# Patient Record
Sex: FEMALE
[De-identification: names, ages, dates, MRNs, and addresses within clinical notes are randomized per-mention and may not be internally consistent; named-entity substitution may affect disease eponyms.]

---

## 2020-02-18 ENCOUNTER — APPOINTMENT (OUTPATIENT)
Dept: INTERNAL MEDICINE | Facility: CLINIC | Age: 35
End: 2020-02-18
Payer: MEDICAID

## 2020-02-18 VITALS
WEIGHT: 157 LBS | SYSTOLIC BLOOD PRESSURE: 108 MMHG | HEART RATE: 64 BPM | BODY MASS INDEX: 23.25 KG/M2 | DIASTOLIC BLOOD PRESSURE: 68 MMHG | HEIGHT: 69 IN | RESPIRATION RATE: 14 BRPM

## 2020-02-18 DIAGNOSIS — M25.50 PAIN IN UNSPECIFIED JOINT: ICD-10-CM

## 2020-02-18 DIAGNOSIS — Z87.891 PERSONAL HISTORY OF NICOTINE DEPENDENCE: ICD-10-CM

## 2020-02-18 DIAGNOSIS — E06.3 AUTOIMMUNE THYROIDITIS: ICD-10-CM

## 2020-02-18 DIAGNOSIS — Z83.3 FAMILY HISTORY OF DIABETES MELLITUS: ICD-10-CM

## 2020-02-18 DIAGNOSIS — R59.0 LOCALIZED ENLARGED LYMPH NODES: ICD-10-CM

## 2020-02-18 DIAGNOSIS — R41.0 DISORIENTATION, UNSPECIFIED: ICD-10-CM

## 2020-02-18 DIAGNOSIS — Z86.19 PERSONAL HISTORY OF OTHER INFECTIOUS AND PARASITIC DISEASES: ICD-10-CM

## 2020-02-18 DIAGNOSIS — R51 HEADACHE: ICD-10-CM

## 2020-02-18 DIAGNOSIS — K90.0 CELIAC DISEASE: ICD-10-CM

## 2020-02-18 DIAGNOSIS — Z80.0 FAMILY HISTORY OF MALIGNANT NEOPLASM OF DIGESTIVE ORGANS: ICD-10-CM

## 2020-02-18 DIAGNOSIS — E55.9 VITAMIN D DEFICIENCY, UNSPECIFIED: ICD-10-CM

## 2020-02-18 DIAGNOSIS — Z80.3 FAMILY HISTORY OF MALIGNANT NEOPLASM OF BREAST: ICD-10-CM

## 2020-02-18 PROBLEM — Z00.00 ENCOUNTER FOR PREVENTIVE HEALTH EXAMINATION: Status: ACTIVE | Noted: 2020-02-18

## 2020-02-18 PROCEDURE — G0008: CPT

## 2020-02-18 PROCEDURE — 99214 OFFICE O/P EST MOD 30 MIN: CPT | Mod: 25

## 2020-02-18 PROCEDURE — 90688 IIV4 VACCINE SPLT 0.5 ML IM: CPT

## 2020-02-18 PROCEDURE — 99385 PREV VISIT NEW AGE 18-39: CPT | Mod: 25

## 2020-02-18 RX ORDER — DISULFIRAM 250 MG/1
250 TABLET ORAL
Refills: 0 | Status: ACTIVE | COMMUNITY
Start: 2019-12-14

## 2020-02-18 RX ORDER — VITAMIN K2 90 MCG
125 MCG CAPSULE ORAL
Refills: 0 | Status: ACTIVE | COMMUNITY

## 2020-02-18 NOTE — PHYSICAL EXAM
[Well Nourished] : well nourished [Well Developed] : well developed [No Acute Distress] : no acute distress [Well-Appearing] : well-appearing [Normal Sclera/Conjunctiva] : normal sclera/conjunctiva [PERRL] : pupils equal round and reactive to light [EOMI] : extraocular movements intact [Normal Outer Ear/Nose] : the outer ears and nose were normal in appearance [Normal Oropharynx] : the oropharynx was normal [No JVD] : no jugular venous distention [Supple] : supple [Thyroid Normal, No Nodules] : the thyroid was normal and there were no nodules present [No Respiratory Distress] : no respiratory distress  [No Accessory Muscle Use] : no accessory muscle use [Normal Percussion] : the chest was normal to percussion [Clear to Auscultation] : lungs were clear to auscultation bilaterally [Normal Rate] : normal rate  [Regular Rhythm] : with a regular rhythm [Normal S1, S2] : normal S1 and S2 [No Murmur] : no murmur heard [No Carotid Bruits] : no carotid bruits [No Abdominal Bruit] : a ~M bruit was not heard ~T in the abdomen [No Varicosities] : no varicosities [Pedal Pulses Present] : the pedal pulses are present [No Edema] : there was no peripheral edema [No Palpable Aorta] : no palpable aorta [No Extremity Clubbing/Cyanosis] : no extremity clubbing/cyanosis [Normal Appearance] : normal in appearance [No Nipple Discharge] : no nipple discharge [Soft] : abdomen soft [Non Tender] : non-tender [Non-distended] : non-distended [No Masses] : no abdominal mass palpated [No HSM] : no HSM [Normal Bowel Sounds] : normal bowel sounds [Normal Supraclavicular Nodes] : no supraclavicular lymphadenopathy [Normal Axillary Nodes] : no axillary lymphadenopathy [Normal Posterior Cervical Nodes] : no posterior cervical lymphadenopathy [Normal Anterior Cervical Nodes] : no anterior cervical lymphadenopathy [Normal Inguinal Nodes] : no inguinal lymphadenopathy [Normal Femoral Nodes] : no femoral lymphadenopathy [No CVA Tenderness] : no CVA  tenderness [No Spinal Tenderness] : no spinal tenderness [No Joint Swelling] : no joint swelling [Grossly Normal Strength/Tone] : grossly normal strength/tone [No Rash] : no rash [Coordination Grossly Intact] : coordination grossly intact [No Focal Deficits] : no focal deficits [Normal Gait] : normal gait [Deep Tendon Reflexes (DTR)] : deep tendon reflexes were 2+ and symmetric [Speech Grossly Normal] : speech grossly normal [Memory Grossly Normal] : memory grossly normal [Normal Affect] : the affect was normal [Alert and Oriented x3] : oriented to person, place, and time [Normal Insight/Judgement] : insight and judgment were intact [Normal Mood] : the mood was normal [de-identified] : The patient appears well but enervated. [de-identified] : See lymph node exam below. [de-identified] : Very slightly cystic in the upper outer quadrants bilaterally. [de-identified] : 1 very shotty right posterior cervical lymph node, approximately 1 x 3 mm, firm and mobile, nontender.

## 2020-02-18 NOTE — HISTORY OF PRESENT ILLNESS
[FreeTextEntry1] : 34-year-old female here to establish care and for evaluation of a lump on her neck. [de-identified] : The patient recently noted a lump on the back of her neck on the right side.  She describes it as the size of a pea, which occasionally gets a little bigger and then reverts to its original size.  It is nontender and she has noticed no other similar lumps in her neck or elsewhere.\par The patient's history is notable for her having been diagnosed with acute Lyme disease in the summer 2018.  She presented with a bull's-eye rash, fever and a brain fog.  She was treated with a full month of doxycycline, during which her symptoms resolved.  The symptoms all recurred, however, once she stopped taking the antibiotics.  She was treated with another month of doxycycline, which again helped her symptoms.  After the second course of doxycycline, her symptoms again recurred but were less severe.  The symptoms have been chronic since that time.  The patient was running regularly prior to her diagnosis of Lyme disease, but has been unable to run or engage in other physical activity since that time.  She also has trouble concentrating because of the brain fog, which sometimes manifests itself as a sensation of something sucking at the back of her head, and joint pains.  She has been seeing a Lyme specialist, who recently started the patient on disulfiram.  Since starting the disulfiram, her joint pains have improved and she thinks some of her other symptoms may have also.  The patient has lost weight since being diagnosed with Lyme disease, and reports that she weighed approximately 185 pounds prior to that diagnosis.  Part of her treatment has been a diet severely restricted in terms of sweets, and it is likely that her caloric intake is dropped as result.\par The patient has a long history of autoimmune problems.  In college she was given a hepatitis B vaccine to which she had an extremely severe allergic reaction.  She has avoided all vaccines since that time.  She is also very sensitive to gluten and has been confirmed to have celiac disease.  Chronic gastrointestinal symptoms that she had suffered from for some time improved when she adopted a gluten-free diet.  She has been diagnosed with Hashimoto's thyroiditis, but has remained euthyroid.

## 2020-02-18 NOTE — ASSESSMENT
[FreeTextEntry1] : The patient will send me all her recent blood work for my review.  Although she is traveling to [place] at the end of this week, we will remain in touch via the patient portal.  If we deem it necessary for her to see another specialist, we will try to make these arrangements during her return to the [place] in April.

## 2020-02-18 NOTE — REVIEW OF SYSTEMS
[Fatigue] : fatigue [Recent Change In Weight] : ~T recent weight change [Joint Pain] : joint pain [Headache] : headache [Confusion] : confusion [Memory Loss] : memory loss [Swollen Glands] : swollen glands [Negative] : Psychiatric [FreeTextEntry2] : Weight loss since summer 2018. [de-identified] : Neck lump as per HPI.